# Patient Record
Sex: MALE | Race: WHITE | NOT HISPANIC OR LATINO | URBAN - METROPOLITAN AREA
[De-identification: names, ages, dates, MRNs, and addresses within clinical notes are randomized per-mention and may not be internally consistent; named-entity substitution may affect disease eponyms.]

---

## 2023-08-17 NOTE — PROGRESS NOTES
ASSESSMENT AND PLAN:     (K21.9) Gastroesophageal reflux disease, unspecified whether esophagitis present  (primary encounter diagnosis)  Comment: Chronic, not at goal  Using TUMS very frequently  Started back on omeprazole. [facility] symptoms.   Plan: omeprazole (PRILOSEC) 20 MG DR capsule          (R11.0) Chronic nausea  (R10.9) Abdominal cramping  Comment: Chronic, not at goal, undiagnosed.  Differential includes cannabis hyperemesis, cyclic vomiting syndrome, abdominal migraine, gastritis, gastroparesis, functional GI disorder.    We discussed how a 6 week period off of cannabis is not enough time to rule out that it is contributing to his chronic symptoms given the fat distribution of THC. We discussed how the ongoing use makes the situation diagnostically complicated because some of the diagnostic possibilities are diagnoses of exclusion. We discussed that we would use antiemetics to help with his nausea while coming off of the THC.    Previous H. Pylori testing was serum antibody only. Check stool antigen while off PPI/H2 now. Then start on PPI as above.     If H. Pylori present, will treat.  If negative, will see how symptoms are on PPI. Based on previous trials, I'm not expecting the cramping or vomiting to improve much. If this is the case, I would offer to start him on amitriptyline 25mg daily for CVS/abdominal migraine. Paddy reports potentially being on this in the past and having some relief. We discussed the potential side effects of this medication.    With Paddy's history of pancreatitis and intussusception, I do think it would be helpful to get GI's insight on his symptoms and have placed this referral.     From chart review, it looks like his only celiac testing was an anti-endomysial antibody. I am going to get TTG and total IgA levels today. Has been eating gluten in recent months.     Plan: omeprazole (PRILOSEC) 20 MG DR capsule,         Helicobacter pylori  Antigen Stool, Adult GI          Referral - Consult Only,         Comprehensive metabolic panel, Tissue         transglutaminase antibody IgA, IgA            (D64.9) Normocytic anemia  Comment: Found incidentally on last CBC. Update today with iron stores.   Plan: CBC with platelets differential, Ferritin, Iron        and iron binding capacity          (Z11.4) Screening for HIV (human immunodeficiency virus)  Comment: Plan: HIV Antigen Antibody Combo          (Z11.59) Encounter for hepatitis C screening test for low risk patient  Comment: Plan: Hepatitis C Screen Reflex to HCV RNA Quant and         Genotype          (Z13.220) Screening cholesterol level  Comment: Plan: Lipid panel reflex to direct LDL Non-fasting                      Aris Perez MD   [facility]  08/18/2023, 3:20 PM      SUBJECTIVE:   Paddy is a 38 year old male who presents to clinic today to establish care and to discuss the following problem(s).    GI Concerns  - Started with H. pylori infection in 2010. Treated with amoxicillin.    - Diagnosed with blood    - No confirmatory test for clearing infection   - Following infection, frequent nausea, vomiting, stomach cramping     - Visited ED ~10 times since 2010 for symptoms; treated with Zofran, Benadryl, and Dilaudid   - Dx with cyclical vomiting (possibly cannabinoid hyperemesis)    - Uses marijuana to help with nausea and vomiting symptoms   - Tried to stop smoking marijuana twice for a month-month and half at a time, little impact on symptoms   - Diagnosed with acute pancreatitis in 2011, unclear etiology  - Cholecystectomy in early 2020, incidental gallstones   - Has history of small bowel (mid jejunum) intussusception, requiring admission in 03/2020   - Worsening of vomiting symptoms, sick with fever  - Underwent diagnostic laparoscopy, mini laparotomy, and small bowel resection (15cm) 3/6/20  - After surgery, returned to baseline GI symptoms     Current  symptoms:   - Stomach cramping every morning and sometimes later into the day, comes in waves   - Occasional vomiting (once per month), notices undigested food   - Frequent heartburn symptoms   - Changes in stool consistency: occasional constipation (harder, smaller stools), infrequent diarrhea (looser, liquidy stools)   - Uses water, alkaselzer, tums, walks, smoking seem to help with stomach cramping   - Takes 10-20 Tums per day   - Alternating hot and cold showers helps with symptoms   - Mostly liquid diet: Soylent; Feels like it decreases symptoms  - Occasional solid food - meats seem to be ok, sugary foods and mixing foods (i.e., spicy and sweet) are a trigger       - Largely able to manage symptoms during every day life, overnight trips are difficult   - Has taken a toll on mood    - Not currently following with counselor or psychiatry    - Celiac in half-sister, stomach cancer in paternal grandmother; otherwise no family history of GI symptoms     -Current medication regimen:   Hydroxyzine 25mg daily PRN, approximately 4 times per week    - Tried Xanax once from a friend: very helpful           Past Medical History:   Diagnosis Date    H. pylori infection 2010    Pancreatitis 2011     Past Surgical History:   Procedure Laterality Date    CHOLECYSTECTOMY  2020    SMALL BOWEL RESECTION  2020     Family History   Problem Relation Age of Onset    Stomach Cancer Paternal Grandmother 60    Celiac Disease Half-Sister     Esophageal Cancer No family hx of     Colon Cancer No family hx of     Pancreatic Cancer No family hx of     Pancreatitis No family hx of     Inflammatory Bowel Disease No family hx of      Social History     Tobacco Use    Smoking status: Former     Packs/day: 0.50     Years: 5.00     Pack years: 2.50     Types: Cigarettes     Quit date: 2013     Years since quitting: 10.6    Smokeless tobacco: Never   Vaping Use    Vaping Use: Never used   Substance Use Topics    Alcohol use: Yes     Comment:  "Drinking about 2 times a month, has about 2 drinks    Drug use: Yes     Frequency: 7.0 times per week     Types: Marijuana     Comment: Smoking daily, gram to gram and a half per day     Social History     Social History Narrative    Not on file       Current Outpatient Medications   Medication    ALBUTEROL 90 MCG/ACT IN AERS    calcium carbonate (TUMS) 500 MG chewable tablet    hydrOXYzine (ATARAX) 25 MG tablet    valACYclovir (VALTREX) 500 MG tablet    ADVAIR DISKUS 250-50 MCG/DOSE IN MISC     No current facility-administered medications for this visit.     I have reviewed the patient's past medical, surgical, family, and social history.     OBJECTIVE:   /66 (BP Location: Left arm, Patient Position: Sitting, Cuff Size: Adult Regular)   Pulse 70   Temp 98.2  F (36.8  C) (Skin)   Resp 15   Ht 1.803 m (5' 11\")   Wt 82.1 kg (181 lb)   SpO2 95%   BMI 25.24 kg/m      Constitutional: well-appearing, appears stated age  Eyes: conjunctivae without erythema, sclera anicteric.   Cardiac: regular rate and rhythm, normal S1/S2, no murmur/rubs/gallops  Respiratory: lungs clear to auscultation bilaterally, normal work of breathing, no wheezes/crackles  Skin: no rashes, lesions, or wounds  Psych: affect is full and appropriate, speech is fluent and non-pressured  "

## 2023-08-18 ENCOUNTER — OFFICE VISIT (OUTPATIENT)
Dept: FAMILY MEDICINE | Facility: CLINIC | Age: 38
End: 2023-08-18
Payer: COMMERCIAL

## 2023-08-18 VITALS
RESPIRATION RATE: 15 BRPM | HEIGHT: 71 IN | SYSTOLIC BLOOD PRESSURE: 105 MMHG | OXYGEN SATURATION: 95 % | BODY MASS INDEX: 25.34 KG/M2 | DIASTOLIC BLOOD PRESSURE: 66 MMHG | TEMPERATURE: 98.2 F | WEIGHT: 181 LBS | HEART RATE: 70 BPM

## 2023-08-18 DIAGNOSIS — K21.9 GASTROESOPHAGEAL REFLUX DISEASE, UNSPECIFIED WHETHER ESOPHAGITIS PRESENT: Primary | ICD-10-CM

## 2023-08-18 DIAGNOSIS — Z11.4 SCREENING FOR HIV (HUMAN IMMUNODEFICIENCY VIRUS): ICD-10-CM

## 2023-08-18 DIAGNOSIS — Z13.220 SCREENING CHOLESTEROL LEVEL: ICD-10-CM

## 2023-08-18 DIAGNOSIS — R10.9 ABDOMINAL CRAMPING: ICD-10-CM

## 2023-08-18 DIAGNOSIS — Z11.59 ENCOUNTER FOR HEPATITIS C SCREENING TEST FOR LOW RISK PATIENT: ICD-10-CM

## 2023-08-18 DIAGNOSIS — R11.0 CHRONIC NAUSEA: ICD-10-CM

## 2023-08-18 DIAGNOSIS — D64.9 NORMOCYTIC ANEMIA: ICD-10-CM

## 2023-08-18 PROBLEM — F12.90 MARIJUANA USE: Status: ACTIVE | Noted: 2018-05-29

## 2023-08-18 LAB
ALBUMIN SERPL BCG-MCNC: 4.7 G/DL (ref 3.5–5.2)
ALP SERPL-CCNC: 55 U/L (ref 40–129)
ALT SERPL W P-5'-P-CCNC: 19 U/L (ref 0–70)
ANION GAP SERPL CALCULATED.3IONS-SCNC: 12 MMOL/L (ref 7–15)
AST SERPL W P-5'-P-CCNC: 36 U/L (ref 0–45)
BASO+EOS+MONOS # BLD AUTO: 0.5 10E3/UL (ref 0–2.2)
BASO+EOS+MONOS NFR BLD AUTO: 8 %
BILIRUB SERPL-MCNC: 0.4 MG/DL
BUN SERPL-MCNC: 24.7 MG/DL (ref 6–20)
CALCIUM SERPL-MCNC: 9.7 MG/DL (ref 8.6–10)
CHLORIDE SERPL-SCNC: 102 MMOL/L (ref 98–107)
CHOLEST SERPL-MCNC: 142 MG/DL
CREAT SERPL-MCNC: 1.19 MG/DL (ref 0.67–1.17)
DEPRECATED HCO3 PLAS-SCNC: 28 MMOL/L (ref 22–29)
ERYTHROCYTE [DISTWIDTH] IN BLOOD BY AUTOMATED COUNT: 12 % (ref 10–15)
FERRITIN SERPL-MCNC: 99 NG/ML (ref 31–409)
GFR SERPL CREATININE-BSD FRML MDRD: 81 ML/MIN/1.73M2
GLUCOSE SERPL-MCNC: 88 MG/DL (ref 70–99)
HCT VFR BLD AUTO: 42 % (ref 40–53)
HDLC SERPL-MCNC: 43 MG/DL
HGB BLD-MCNC: 14.1 G/DL (ref 13.3–17.7)
IRON BINDING CAPACITY (ROCHE): 412 UG/DL (ref 240–430)
IRON SATN MFR SERPL: 27 % (ref 15–46)
IRON SERPL-MCNC: 112 UG/DL (ref 61–157)
LDLC SERPL CALC-MCNC: 60 MG/DL
LYMPHOCYTES # BLD AUTO: 1.9 10E3/UL (ref 0.8–5.3)
LYMPHOCYTES NFR BLD AUTO: 35 %
MCH RBC QN AUTO: 31.6 PG (ref 26.5–33)
MCHC RBC AUTO-ENTMCNC: 33.6 G/DL (ref 31.5–36.5)
MCV RBC AUTO: 94 FL (ref 78–100)
NEUTROPHILS # BLD AUTO: 3.2 10E3/UL (ref 1.6–8.3)
NEUTROPHILS NFR BLD AUTO: 57 %
NONHDLC SERPL-MCNC: 99 MG/DL
PLATELET # BLD AUTO: 194 10E3/UL (ref 150–450)
POTASSIUM SERPL-SCNC: 4.2 MMOL/L (ref 3.4–5.3)
PROT SERPL-MCNC: 7.2 G/DL (ref 6.4–8.3)
RBC # BLD AUTO: 4.46 10E6/UL (ref 4.4–5.9)
SODIUM SERPL-SCNC: 142 MMOL/L (ref 136–145)
TRIGL SERPL-MCNC: 194 MG/DL
WBC # BLD AUTO: 5.6 10E3/UL (ref 4–11)

## 2023-08-18 PROCEDURE — 83550 IRON BINDING TEST: CPT | Performed by: FAMILY MEDICINE

## 2023-08-18 PROCEDURE — 82728 ASSAY OF FERRITIN: CPT | Performed by: FAMILY MEDICINE

## 2023-08-18 PROCEDURE — 86803 HEPATITIS C AB TEST: CPT | Performed by: FAMILY MEDICINE

## 2023-08-18 PROCEDURE — 80061 LIPID PANEL: CPT | Performed by: FAMILY MEDICINE

## 2023-08-18 PROCEDURE — 86364 TISS TRNSGLTMNASE EA IG CLAS: CPT | Performed by: FAMILY MEDICINE

## 2023-08-18 PROCEDURE — 80053 COMPREHEN METABOLIC PANEL: CPT | Performed by: FAMILY MEDICINE

## 2023-08-18 PROCEDURE — 87389 HIV-1 AG W/HIV-1&-2 AB AG IA: CPT | Performed by: FAMILY MEDICINE

## 2023-08-18 PROCEDURE — 82784 ASSAY IGA/IGD/IGG/IGM EACH: CPT | Performed by: FAMILY MEDICINE

## 2023-08-18 RX ORDER — HYDROXYZINE HYDROCHLORIDE 25 MG/1
25 TABLET, FILM COATED ORAL DAILY PRN
COMMUNITY

## 2023-08-18 RX ORDER — CALCIUM CARBONATE 500 MG/1
1 TABLET, CHEWABLE ORAL
COMMUNITY

## 2023-08-18 RX ORDER — VALACYCLOVIR HYDROCHLORIDE 500 MG/1
500 TABLET, FILM COATED ORAL DAILY PRN
COMMUNITY
End: 2024-06-11

## 2023-08-18 ASSESSMENT — ASTHMA QUESTIONNAIRES
QUESTION_2 LAST FOUR WEEKS HOW OFTEN HAVE YOU HAD SHORTNESS OF BREATH: NOT AT ALL
QUESTION_1 LAST FOUR WEEKS HOW MUCH OF THE TIME DID YOUR ASTHMA KEEP YOU FROM GETTING AS MUCH DONE AT WORK, SCHOOL OR AT HOME: NONE OF THE TIME
ACT_TOTALSCORE: 22
QUESTION_4 LAST FOUR WEEKS HOW OFTEN HAVE YOU USED YOUR RESCUE INHALER OR NEBULIZER MEDICATION (SUCH AS ALBUTEROL): TWO OR THREE TIMES PER WEEK
QUESTION_5 LAST FOUR WEEKS HOW WOULD YOU RATE YOUR ASTHMA CONTROL: WELL CONTROLLED
QUESTION_3 LAST FOUR WEEKS HOW OFTEN DID YOUR ASTHMA SYMPTOMS (WHEEZING, COUGHING, SHORTNESS OF BREATH, CHEST TIGHTNESS OR PAIN) WAKE YOU UP AT NIGHT OR EARLIER THAN USUAL IN THE MORNING: NOT AT ALL
ACT_TOTALSCORE: 22

## 2023-08-18 NOTE — PATIENT INSTRUCTIONS
Collect stool sample for H. Pylori test     Then start taking omeprazole 20mg, 30 minutes before your first meal of the day     3.   The GI schedulers should call you within 2-3 business days. If you don't hear from them, please call [phone]     If the appointment you get is further away than you would like, let me know and we'll refer you to [facility], a private group    4) Give me an update on MyChart regarding the heart burn and cramping 2-3 week after starting the omeprazole  
.

## 2023-08-18 NOTE — NURSING NOTE
"38 year old  Chief Complaint   Patient presents with    Establish Care     Pt would like to go over their GI history and discuss a new plan.        Blood pressure 105/66, pulse 70, temperature 98.2  F (36.8  C), temperature source Skin, resp. rate 15, height 1.803 m (5' 11\"), weight 82.1 kg (181 lb), SpO2 95 %. Body mass index is 25.24 kg/m .  Patient Active Problem List   Diagnosis    Routine general medical examination at a health care facility    Moderate persistent asthma       Wt Readings from Last 2 Encounters:   08/18/23 82.1 kg (181 lb)   04/26/05 71.6 kg (157 lb 12.8 oz) (60 %, Z= 0.26)*     * Growth percentiles are based on CDC (Boys, 2-20 Years) data.     BP Readings from Last 3 Encounters:   08/18/23 105/66   10/05/16 141/90   04/26/05 98/60         Current Outpatient Medications   Medication    ALBUTEROL 90 MCG/ACT IN AERS    ADVAIR DISKUS 250-50 MCG/DOSE IN MISC     No current facility-administered medications for this visit.       Social History     Tobacco Use    Smoking status: Never    Smokeless tobacco: Never   Vaping Use    Vaping Use: Never used   Substance Use Topics    Alcohol use: Yes     Comment: social    Drug use: No       Health Maintenance Due   Topic Date Due    ADVANCE CARE PLANNING  Never done    Pneumococcal Vaccine: Pediatrics (0 to 5 Years) and At-Risk Patients (6 to 64 Years) (1 - PCV) Never done    HIV SCREENING  Never done    HEPATITIS C SCREENING  Never done    YEARLY PREVENTIVE VISIT  06/29/2005    ASTHMA ACTION PLAN  04/26/2006    LIPID  Never done    COVID-19 Vaccine (4 - Pfizer series) 09/16/2022       No results found for: PAP      August 18, 2023 2:47 PM    "

## 2023-08-19 LAB
HCV AB SERPL QL IA: NONREACTIVE
HIV 1+2 AB+HIV1 P24 AG SERPL QL IA: NONREACTIVE

## 2023-08-21 LAB
IGA SERPL-MCNC: 278 MG/DL (ref 84–499)
TTG IGA SER-ACNC: 0.5 U/ML

## 2023-08-22 PROBLEM — R79.89 ELEVATED SERUM CREATININE: Chronic | Status: ACTIVE | Noted: 2023-08-22

## 2023-08-27 ENCOUNTER — HEALTH MAINTENANCE LETTER (OUTPATIENT)
Age: 38
End: 2023-08-27

## 2023-09-29 ENCOUNTER — TELEPHONE (OUTPATIENT)
Dept: FAMILY MEDICINE | Facility: CLINIC | Age: 38
End: 2023-09-29

## 2023-09-29 DIAGNOSIS — J45.40 MODERATE PERSISTENT ASTHMA WITHOUT COMPLICATION: Primary | ICD-10-CM

## 2023-09-29 NOTE — TELEPHONE ENCOUNTER
Medication Question or Refill        What medication are you calling about (include dose and sig)?: Outpatient Medication Detail     Disp Refills Start End JUAN LUIS   ALBUTEROL 90 MCG/ACT IN AERS 2 6 times at one each 6/29/2004  --   Sig - Route: 1-2 puffs Q 4-6 hrs prn - Inhalation   Class: Normal   Order: [id number]       Preferred Pharmacy:   [id number] [place] [address] & [address] [address]  Phone: [phone] Fax: [phone]      Controlled Substance Agreement on file:   CSA -- Patient Level:    CSA: None found at the patient level.       Who prescribed the medication?: STARKS    Do you need a refill? Yes    When did you use the medication last? ABOUT 10-15 PUFFS THEFT ON EXISTING ONE     [facility]    Do you have any questions or concerns?  Yes: JUST NEEDS REFILL       Could we send this information to you in [email or web address] or would you prefer to receive a phone call?:   Patient would like to be contacted via [email or web address]

## 2023-10-03 RX ORDER — ALBUTEROL SULFATE 90 UG/1
2 AEROSOL, METERED RESPIRATORY (INHALATION) EVERY 4 HOURS PRN
Qty: 18 G | Refills: 11 | Status: SHIPPED | OUTPATIENT
Start: 2023-10-03

## 2024-08-20 DIAGNOSIS — B00.9 HSV (HERPES SIMPLEX VIRUS) INFECTION: ICD-10-CM

## 2024-08-21 RX ORDER — VALACYCLOVIR HYDROCHLORIDE 500 MG/1
TABLET, FILM COATED ORAL
Qty: 8 TABLET | Refills: 0 | Status: SHIPPED | OUTPATIENT
Start: 2024-08-21

## 2024-08-21 NOTE — TELEPHONE ENCOUNTER
Medication requested: valACYclovir (VALTREX) 500 MG tablet   Last office visit: 8/18/23  [facility] appointments: none  Medication last refilled: 6/12/24; 8 + 0 refills  Last qualifying labs: N/A    Prescription approved per [facility] Refill Protocol.    Dread DUQUE, RN  08/21/24 12:09 PM

## 2024-10-20 ENCOUNTER — HEALTH MAINTENANCE LETTER (OUTPATIENT)
Age: 39
End: 2024-10-20

## 2024-10-23 ENCOUNTER — TELEPHONE (OUTPATIENT)
Dept: FAMILY MEDICINE | Facility: CLINIC | Age: 39
End: 2024-10-23
Payer: COMMERCIAL

## 2024-10-23 DIAGNOSIS — J45.40 MODERATE PERSISTENT ASTHMA WITHOUT COMPLICATION: ICD-10-CM

## 2024-10-23 RX ORDER — ALBUTEROL SULFATE 90 UG/1
2 INHALANT RESPIRATORY (INHALATION) EVERY 4 HOURS PRN
Qty: 18 G | Refills: 1 | Status: SHIPPED | OUTPATIENT
Start: 2024-10-23

## 2024-10-23 NOTE — TELEPHONE ENCOUNTER
[facility] Call Center    Phone Message    May a detailed message be left on voicemail: yes     Reason for Call: Medication Refill Request    Has the patient contacted the pharmacy for the refill? Yes   Name of medication being requested: albuterol (PROAIR HFA/PROVENTIL HFA/VENTOLIN HFA) 108 (90 Base) MCG/ACT inhaler [[id number]] (Order   Provider who prescribed the medication: Christina  Pharmacy: [id number] [place] [address] & [facility]    Date medication is needed: ASAP   Pt states he got message stating that his care has been switched to Christina at [facility]   Action Taken: Message routed to:  Other: S    Travel Screening: Not Applicable

## 2024-10-23 NOTE — TELEPHONE ENCOUNTER
Medication requested: albuterol (PROAIR HFA/PROVENTIL HFA/VENTOLIN HFA) 108 (90 Base) MCG/ACT inhaler   Last office visit: 8/18/23  [facility] appointments: none  Medication last refilled: 10/3/23; 18 g + 11 refills  Last qualifying labs:    8/18/2023  2:45 PM   Asthma Control Test    ACT Total Score (Goal Greater than or Equal to 20) 22      Medication is being filled for 1 time refill only due to:  Patient needs to be seen because it has been more than one year since last visit.    Sent message to pt to schedule appt with Dr. Vasquez.    Dread Franks - DUNIA, RN  10/23/24 1:06 PM